# Patient Record
Sex: MALE | Race: WHITE
[De-identification: names, ages, dates, MRNs, and addresses within clinical notes are randomized per-mention and may not be internally consistent; named-entity substitution may affect disease eponyms.]

---

## 2019-10-18 NOTE — MRI
MRI LUMBAR SPINE WITH AND WITHOUT CONTRAST:



DATE:

10/18/2019



HISTORY:

84-year-old male with lumbar radiculopathy and low back pain



COMPARISON:

None available



TECHNIQUE:

Multiple sequences obtained in axial and sagittal planes, pre and post IV injection of gadolinium-bas
ed contrast agent.



FINDINGS:



For the purposes of this report, it will be assumed that there are 5 lumbar-type vertebrae. Vertebral
 body heights are maintained.



Marrow edema around a Schmorl's node at right lateral endplate of L3. Otherwise no major bone marrow 
signal abnormality. No major spondylolisthesis. Conus medullaris terminates at L1. Because of the

extremely severe central spinal canal stenosis at L3-4, there is tortuosity of the cauda equina at al
l levels superior to this and moderately at all levels inferior to this. No abnormal enhancement of

cauda equina. No epidural abscess. Presence of posterior element metallic hardware at lower levels ca
uses magnetic susceptibility artifact, making it difficult to evaluate for abnormal enhancement and

marrow signal abnormality at the lower levels, especially at the posterior elements.



T12-L1:Mild to moderate ligamentum flavum thickening. Mild bilateral facet DJD. Disc space maintained
. Minimal disc bulge. Mild central spinal canal stenosis. Moderate right neural foraminal stenosis.

Mild to moderate left neural foraminal stenosis.



L1-2:Mild disc space narrowing. Mild to moderate diffuse disc bulge. Moderate bilateral neural forami
nal stenosis.



L2-3:Moderate disc space narrowing. Prominent diffuse disc bulge. Moderate bilateral neural foraminal
 stenosis. Severe central spinal canal stenosis. Moderate bilateral facet DJD. Slight degenerative

retrolisthesis of L2 on L3.



L3-4:Severe bilateral facet DJD with significant hypertrophy. Moderate bilateral neural foraminal nahid
nosis, left worse than right. Mild to moderate disc space narrowing. In addition to diffuse disc

bulge, there is a small-moderate sized central disc herniation with slight superior migration of extr
uded disc material not quite reaching pedicle level of L3. Extremely severe central spinal canal

stenosis.



L4-5:Moderate right neural foraminal stenosis. Possibly severe left neural foraminal stenosis, althou
gh left posterior element metallic hardware magnetic susceptibility artifact carotid resulted in

apparently worst degree of neural foraminal stenosis at than actually present. Likewise, the presence
 of bilateral posterior element metallic hardware causes susceptibility artifact that makes it

difficult to evaluate the degree of central spinal canal stenosis and especially lateral recess steno
sis. Central spinal canal stenosis is probably not severe. Disc space maintained.



L5-S1:Severe disc space narrowing. No central stenosis or lateral recess stenosis. Posterior element 
metallic hardware makes it impossible to evaluate the degree of neural foraminal stenosis.



IMPRESSION:

1) lumbar spondylosis with multilevel degenerative disc disease and facet osteoarthrosis of varying d
egrees.

2) extremely severe central spinal canal stenosis at L3-4.

3) severe central spinal canal stenosis at L2-3.

4) posterior element metallic hardware causes magnetic susceptibility artifact, which makes it diffic
ult to evaluate degree of neural foraminal stenosis bilaterally at L5-S1 and on the left at L4-5,

and difficult to evaluate the degree of central spinal canal stenosis at L4-5.



Reported By: Jose Francisco Juarez 

Electronically Signed:  10/18/2019 2:11 PM

## 2019-10-22 NOTE — CT
CT LUMBAR SPINE WITHOUT CONTRAST

Multiplanar reconstruction and 3D post processing.

 

INDICATION: 

Lumbar stenosis with claudication.  Back pain.

 

FINDINGS: 

Lumbar vertebrae maintain height.  Moderate degenerative changes are present with osteophytes from al
l lumbar vertebrae.  Degenerative disk changes are noted.  Vacuum phenomenon was seen at L3-4 and L4-
5.  Loss of disk space is prominent at L5-S1.  

 

At L1-2, mild disk bulge.  Prominent facet hypertrophy.  Mild central canal stenosis.

 

At L2-3, slight posterolisthesis.  Disk bulge.  Prominent facet and ligamentous hypertrophy.  Severe 
central canal stenosis.  Bilateral foraminal stenosis.

 

At L3-4, degenerative disk change.  Mild anterolisthesis.  Diffuse disk bulge.  Prominent facet hyper
trophy.  Severe central canal stenosis.  Bilateral foraminal stenosis.  

 

At L4-5, broad-based disk bulge.  Severe facet hypertrophy.  Severe central canal stenosis.  Bilatera
l foraminal stenosis.  

 

Pedicle screws at L5.  The left pedicle screw courses along the superior margin of the left pedicle a
nd encroaches into the inferior aspect of the left foramina.  There are posterior laminectomy changes
 at this level.

 

At L5-S1, disk bulge and hypertrophic change.  Facet hypertrophy.  Mild central canal stenosis.  Bila
teral foraminal stenosis.

 

Bilateral pedicle screws at S1 extend along the superior aspect of the pedicle and encroach into the 
inferior aspect of the foramen on both sides.

 

IMPRESSION: 

1.  Severe central canal stenosis at L2-3 and L3-4 with foraminal stenosis described above.  Postoper
ative changes at L4-5 and L5-S1 as described.

 

2.  Pedicle screw placement as noted above.

 

POS: NEHAL

## 2019-10-22 NOTE — RAD
EXAM: 3 views of the lumbosacral spine



HISTORY: Low back pain



COMPARISON: None



FINDINGS: 3 views of the lumbosacral spine shows normal height and alignment of the vertebral bodies 
without fracture or subluxation. Intervertebral disc space narrowing and moderate osteophyte

formation is seen throughout the lumbar spine. Facet screws are seen at L4/5 and L5/S1. Alignment is 
unchanged with flexion and extension.



The sacroiliac joints are unremarkable.



IMPRESSION: Degenerative changes and post surgical changes of lumbar spine with unchanged alignment w
ith bending.



Reported By: Jeffrey Perea 

Electronically Signed:  10/22/2019 3:13 PM

## 2021-11-18 ENCOUNTER — HOSPITAL ENCOUNTER (INPATIENT)
Dept: HOSPITAL 18 - NAV ACUTE | Age: 86
LOS: 33 days | Discharge: NURSING, REHAB OR LONG-TERM CARE | DRG: 558 | End: 2021-12-21
Attending: INTERNAL MEDICINE | Admitting: INTERNAL MEDICINE
Payer: MEDICARE

## 2021-11-18 VITALS — BODY MASS INDEX: 29.6 KG/M2

## 2021-11-18 DIAGNOSIS — Z95.1: ICD-10-CM

## 2021-11-18 DIAGNOSIS — M46.1: ICD-10-CM

## 2021-11-18 DIAGNOSIS — R53.1: ICD-10-CM

## 2021-11-18 DIAGNOSIS — I25.10: ICD-10-CM

## 2021-11-18 DIAGNOSIS — Z87.891: ICD-10-CM

## 2021-11-18 DIAGNOSIS — B95.62: ICD-10-CM

## 2021-11-18 DIAGNOSIS — R09.02: ICD-10-CM

## 2021-11-18 DIAGNOSIS — Z88.0: ICD-10-CM

## 2021-11-18 DIAGNOSIS — Z79.899: ICD-10-CM

## 2021-11-18 DIAGNOSIS — G89.29: ICD-10-CM

## 2021-11-18 DIAGNOSIS — Z79.02: ICD-10-CM

## 2021-11-18 DIAGNOSIS — M71.152: Primary | ICD-10-CM

## 2021-11-18 DIAGNOSIS — Z85.46: ICD-10-CM

## 2021-11-18 DIAGNOSIS — E11.9: ICD-10-CM

## 2021-11-18 DIAGNOSIS — Z79.84: ICD-10-CM

## 2021-11-18 DIAGNOSIS — E78.5: ICD-10-CM

## 2021-11-18 DIAGNOSIS — Z96.651: ICD-10-CM

## 2021-11-18 DIAGNOSIS — I10: ICD-10-CM

## 2021-11-18 DIAGNOSIS — Z95.5: ICD-10-CM

## 2021-11-18 DIAGNOSIS — M54.42: ICD-10-CM

## 2021-11-18 DIAGNOSIS — K21.9: ICD-10-CM

## 2021-11-18 DIAGNOSIS — F32.A: ICD-10-CM

## 2021-11-18 DIAGNOSIS — Z20.822: ICD-10-CM

## 2021-11-18 DIAGNOSIS — F41.9: ICD-10-CM

## 2021-11-18 DIAGNOSIS — Z86.73: ICD-10-CM

## 2021-11-18 PROCEDURE — 83036 HEMOGLOBIN GLYCOSYLATED A1C: CPT

## 2021-11-18 PROCEDURE — 90471 IMMUNIZATION ADMIN: CPT

## 2021-11-18 PROCEDURE — 81001 URINALYSIS AUTO W/SCOPE: CPT

## 2021-11-18 PROCEDURE — U0003 INFECTIOUS AGENT DETECTION BY NUCLEIC ACID (DNA OR RNA); SEVERE ACUTE RESPIRATORY SYNDROME CORONAVIRUS 2 (SARS-COV-2) (CORONAVIRUS DISEASE [COVID-19]), AMPLIFIED PROBE TECHNIQUE, MAKING USE OF HIGH THROUGHPUT TECHNOLOGIES AS DESCRIBED BY CMS-2020-01-R: HCPCS

## 2021-11-18 PROCEDURE — U0002 COVID-19 LAB TEST NON-CDC: HCPCS

## 2021-11-18 PROCEDURE — U0005 INFEC AGEN DETEC AMPLI PROBE: HCPCS

## 2021-11-18 PROCEDURE — 85025 COMPLETE CBC W/AUTO DIFF WBC: CPT

## 2021-11-18 PROCEDURE — 72131 CT LUMBAR SPINE W/O DYE: CPT

## 2021-11-18 PROCEDURE — 90662 IIV NO PRSV INCREASED AG IM: CPT

## 2021-11-18 PROCEDURE — 36416 COLLJ CAPILLARY BLOOD SPEC: CPT

## 2021-11-18 PROCEDURE — G0008 ADMIN INFLUENZA VIRUS VAC: HCPCS

## 2021-11-18 PROCEDURE — 36415 COLL VENOUS BLD VENIPUNCTURE: CPT

## 2021-11-18 PROCEDURE — 80048 BASIC METABOLIC PNL TOTAL CA: CPT

## 2021-11-18 PROCEDURE — 84550 ASSAY OF BLOOD/URIC ACID: CPT

## 2021-11-18 RX ADMIN — HYDROCODONE BITARTRATE AND ACETAMINOPHEN PRN TAB: 5; 325 TABLET ORAL at 23:45

## 2021-11-18 RX ADMIN — SALINE NASAL SPRAY PRN SPR: 1.5 SOLUTION NASAL at 16:51

## 2021-11-19 LAB
ANION GAP SERPL CALC-SCNC: 12 MMOL/L (ref 10–20)
BASOPHILS # BLD AUTO: 0.1 THOU/UL (ref 0–0.2)
BASOPHILS NFR BLD AUTO: 0.8 % (ref 0–1)
BUN SERPL-MCNC: 21 MG/DL (ref 8.4–25.7)
CALCIUM SERPL-MCNC: 9.1 MG/DL (ref 7.8–10.44)
CHLORIDE SERPL-SCNC: 100 MMOL/L (ref 98–107)
CO2 SERPL-SCNC: 30 MMOL/L (ref 23–31)
CREAT CL PREDICTED SERPL C-G-VRATE: 109 ML/MIN (ref 70–130)
EOSINOPHIL # BLD AUTO: 0 THOU/UL (ref 0–0.7)
EOSINOPHIL NFR BLD AUTO: 0.2 % (ref 0–10)
GLUCOSE SERPL-MCNC: 110 MG/DL (ref 83–110)
HGB BLD-MCNC: 11.8 G/DL (ref 14–18)
LYMPHOCYTES # BLD AUTO: 1 THOU/UL (ref 1.2–3.4)
LYMPHOCYTES NFR BLD AUTO: 10.4 % (ref 21–51)
MCH RBC QN AUTO: 32.4 PG (ref 27–31)
MCV RBC AUTO: 100 FL (ref 78–98)
MONOCYTES # BLD AUTO: 0.9 THOU/UL (ref 0.11–0.59)
MONOCYTES NFR BLD AUTO: 9 % (ref 0–10)
NEUTROPHILS # BLD AUTO: 7.7 THOU/UL (ref 1.4–6.5)
NEUTROPHILS NFR BLD AUTO: 79.6 % (ref 42–75)
PLATELET # BLD AUTO: 300 THOU/UL (ref 130–400)
POTASSIUM SERPL-SCNC: 4.2 MMOL/L (ref 3.5–5.1)
RBC # BLD AUTO: 3.63 MILL/UL (ref 4.7–6.1)
SODIUM SERPL-SCNC: 138 MMOL/L (ref 136–145)
WBC # BLD AUTO: 9.6 THOU/UL (ref 4.8–10.8)

## 2021-11-19 RX ADMIN — HYDROCODONE BITARTRATE AND ACETAMINOPHEN PRN TAB: 5; 325 TABLET ORAL at 13:42

## 2021-11-19 RX ADMIN — HYDROCODONE BITARTRATE AND ACETAMINOPHEN PRN TAB: 5; 325 TABLET ORAL at 17:37

## 2021-11-19 RX ADMIN — HYDROCODONE BITARTRATE AND ACETAMINOPHEN PRN TAB: 5; 325 TABLET ORAL at 09:35

## 2021-11-20 RX ADMIN — HYDROCODONE BITARTRATE AND ACETAMINOPHEN PRN TAB: 5; 325 TABLET ORAL at 03:08

## 2021-11-20 RX ADMIN — HYDROCODONE BITARTRATE AND ACETAMINOPHEN PRN TAB: 5; 325 TABLET ORAL at 15:10

## 2021-11-20 RX ADMIN — HYDROCODONE BITARTRATE AND ACETAMINOPHEN PRN TAB: 5; 325 TABLET ORAL at 23:26

## 2021-11-20 RX ADMIN — HYDROCODONE BITARTRATE AND ACETAMINOPHEN PRN TAB: 5; 325 TABLET ORAL at 07:02

## 2021-11-20 RX ADMIN — HYDROCODONE BITARTRATE AND ACETAMINOPHEN PRN TAB: 5; 325 TABLET ORAL at 11:09

## 2021-11-20 RX ADMIN — HYDROCODONE BITARTRATE AND ACETAMINOPHEN PRN TAB: 5; 325 TABLET ORAL at 19:17

## 2021-11-21 RX ADMIN — HYDROCODONE BITARTRATE AND ACETAMINOPHEN PRN TAB: 5; 325 TABLET ORAL at 12:03

## 2021-11-21 RX ADMIN — HYDROCODONE BITARTRATE AND ACETAMINOPHEN PRN TAB: 5; 325 TABLET ORAL at 20:24

## 2021-11-21 RX ADMIN — HYDROCODONE BITARTRATE AND ACETAMINOPHEN PRN TAB: 5; 325 TABLET ORAL at 15:52

## 2021-11-21 RX ADMIN — SALINE NASAL SPRAY PRN SPR: 1.5 SOLUTION NASAL at 07:55

## 2021-11-21 RX ADMIN — HYDROCODONE BITARTRATE AND ACETAMINOPHEN PRN TAB: 5; 325 TABLET ORAL at 08:00

## 2021-11-21 RX ADMIN — HYDROCODONE BITARTRATE AND ACETAMINOPHEN PRN TAB: 5; 325 TABLET ORAL at 03:49

## 2021-11-22 RX ADMIN — ACETAMINOPHEN AND CODEINE PHOSPHATE PRN TAB: 300; 30 TABLET ORAL at 23:07

## 2021-11-22 RX ADMIN — HYDROCODONE BITARTRATE AND ACETAMINOPHEN PRN TAB: 5; 325 TABLET ORAL at 12:03

## 2021-11-22 RX ADMIN — ACETAMINOPHEN AND CODEINE PHOSPHATE PRN TAB: 300; 30 TABLET ORAL at 18:28

## 2021-11-22 RX ADMIN — HYDROCODONE BITARTRATE AND ACETAMINOPHEN PRN TAB: 5; 325 TABLET ORAL at 04:30

## 2021-11-23 LAB
PROT UR STRIP.AUTO-MCNC: 100 MG/DL
RBC UR QL AUTO: (no result) HPF (ref 0–3)
SP GR UR STRIP: 1.02 (ref 1–1.03)
WBC UR QL AUTO: (no result) HPF (ref 0–3)

## 2021-11-23 RX ADMIN — ACETAMINOPHEN AND CODEINE PHOSPHATE PRN TAB: 300; 30 TABLET ORAL at 12:48

## 2021-11-23 RX ADMIN — ACETAMINOPHEN AND CODEINE PHOSPHATE PRN TAB: 300; 30 TABLET ORAL at 17:19

## 2021-11-23 RX ADMIN — SALINE NASAL SPRAY PRN SPR: 1.5 SOLUTION NASAL at 00:28

## 2021-11-23 RX ADMIN — ACETAMINOPHEN AND CODEINE PHOSPHATE PRN TAB: 300; 30 TABLET ORAL at 20:46

## 2021-11-23 RX ADMIN — ACETAMINOPHEN AND CODEINE PHOSPHATE PRN TAB: 300; 30 TABLET ORAL at 04:11

## 2021-11-24 RX ADMIN — ACETAMINOPHEN AND CODEINE PHOSPHATE PRN TAB: 300; 30 TABLET ORAL at 21:01

## 2021-11-24 RX ADMIN — ACETAMINOPHEN AND CODEINE PHOSPHATE PRN TAB: 300; 30 TABLET ORAL at 12:35

## 2021-11-24 RX ADMIN — ACETAMINOPHEN AND CODEINE PHOSPHATE PRN TAB: 300; 30 TABLET ORAL at 06:11

## 2021-11-25 RX ADMIN — ACETAMINOPHEN AND CODEINE PHOSPHATE PRN TAB: 300; 30 TABLET ORAL at 21:13

## 2021-11-25 RX ADMIN — ACETAMINOPHEN AND CODEINE PHOSPHATE PRN TAB: 300; 30 TABLET ORAL at 10:42

## 2021-11-25 RX ADMIN — GUAIFENESIN AND DEXTROMETHORPHAN PRN ML: 100; 10 SYRUP ORAL at 10:41

## 2021-11-25 RX ADMIN — ACETAMINOPHEN AND CODEINE PHOSPHATE PRN TAB: 300; 30 TABLET ORAL at 05:57

## 2021-11-26 RX ADMIN — ACETAMINOPHEN AND CODEINE PHOSPHATE PRN TAB: 300; 30 TABLET ORAL at 11:34

## 2021-11-26 RX ADMIN — ACETAMINOPHEN AND CODEINE PHOSPHATE PRN TAB: 300; 30 TABLET ORAL at 23:45

## 2021-11-26 RX ADMIN — ACETAMINOPHEN AND CODEINE PHOSPHATE PRN TAB: 300; 30 TABLET ORAL at 17:29

## 2021-11-26 RX ADMIN — ACETAMINOPHEN AND CODEINE PHOSPHATE PRN TAB: 300; 30 TABLET ORAL at 08:01

## 2021-11-27 RX ADMIN — ACETAMINOPHEN AND CODEINE PHOSPHATE PRN TAB: 300; 30 TABLET ORAL at 21:18

## 2021-11-27 RX ADMIN — ACETAMINOPHEN AND CODEINE PHOSPHATE PRN TAB: 300; 30 TABLET ORAL at 04:40

## 2021-11-27 RX ADMIN — ACETAMINOPHEN AND CODEINE PHOSPHATE PRN TAB: 300; 30 TABLET ORAL at 17:12

## 2021-11-27 RX ADMIN — ACETAMINOPHEN AND CODEINE PHOSPHATE PRN TAB: 300; 30 TABLET ORAL at 08:04

## 2021-11-28 RX ADMIN — ACETAMINOPHEN AND CODEINE PHOSPHATE PRN TAB: 300; 30 TABLET ORAL at 08:05

## 2021-11-28 RX ADMIN — ACETAMINOPHEN AND CODEINE PHOSPHATE PRN TAB: 300; 30 TABLET ORAL at 20:27

## 2021-11-29 RX ADMIN — ACETAMINOPHEN AND CODEINE PHOSPHATE PRN TAB: 300; 30 TABLET ORAL at 08:11

## 2021-11-29 RX ADMIN — ACETAMINOPHEN AND CODEINE PHOSPHATE PRN TAB: 300; 30 TABLET ORAL at 18:08

## 2021-11-30 RX ADMIN — ACETAMINOPHEN AND CODEINE PHOSPHATE PRN TAB: 300; 30 TABLET ORAL at 11:47

## 2021-11-30 RX ADMIN — ACETAMINOPHEN AND CODEINE PHOSPHATE PRN TAB: 300; 30 TABLET ORAL at 20:19

## 2021-11-30 RX ADMIN — ACETAMINOPHEN AND CODEINE PHOSPHATE PRN TAB: 300; 30 TABLET ORAL at 08:09

## 2021-12-01 RX ADMIN — ACETAMINOPHEN AND CODEINE PHOSPHATE PRN TAB: 300; 30 TABLET ORAL at 19:30

## 2021-12-01 RX ADMIN — ACETAMINOPHEN AND CODEINE PHOSPHATE PRN TAB: 300; 30 TABLET ORAL at 08:05

## 2021-12-02 RX ADMIN — ACETAMINOPHEN AND CODEINE PHOSPHATE PRN TAB: 300; 30 TABLET ORAL at 07:57

## 2021-12-02 RX ADMIN — ACETAMINOPHEN AND CODEINE PHOSPHATE PRN TAB: 300; 30 TABLET ORAL at 13:49

## 2021-12-03 PROCEDURE — 0M9M3ZX DRAINAGE OF LEFT HIP BURSA AND LIGAMENT, PERCUTANEOUS APPROACH, DIAGNOSTIC: ICD-10-PCS | Performed by: INTERNAL MEDICINE

## 2021-12-03 RX ADMIN — ACETAMINOPHEN AND CODEINE PHOSPHATE PRN TAB: 300; 30 TABLET ORAL at 08:08

## 2021-12-05 RX ADMIN — ACETAMINOPHEN AND CODEINE PHOSPHATE PRN TAB: 300; 30 TABLET ORAL at 08:00

## 2021-12-06 RX ADMIN — SULFAMETHOXAZOLE AND TRIMETHOPRIM SCH TAB: 800; 160 TABLET ORAL at 20:29

## 2021-12-07 LAB
ANION GAP SERPL CALC-SCNC: 16 MMOL/L (ref 10–20)
BASOPHILS # BLD AUTO: 0.1 THOU/UL (ref 0–0.2)
BASOPHILS NFR BLD AUTO: 1.4 % (ref 0–1)
BUN SERPL-MCNC: 22 MG/DL (ref 8.4–25.7)
CALCIUM SERPL-MCNC: 10.1 MG/DL (ref 7.8–10.44)
CHLORIDE SERPL-SCNC: 99 MMOL/L (ref 98–107)
CO2 SERPL-SCNC: 24 MMOL/L (ref 23–31)
CREAT CL PREDICTED SERPL C-G-VRATE: 73 ML/MIN (ref 70–130)
EOSINOPHIL # BLD AUTO: 0.1 THOU/UL (ref 0–0.7)
EOSINOPHIL NFR BLD AUTO: 2.4 % (ref 0–10)
GLUCOSE SERPL-MCNC: 110 MG/DL (ref 83–110)
HGB BLD-MCNC: 13.5 G/DL (ref 14–18)
LYMPHOCYTES # BLD AUTO: 0.7 THOU/UL (ref 1.2–3.4)
LYMPHOCYTES NFR BLD AUTO: 10.6 % (ref 21–51)
MCH RBC QN AUTO: 32 PG (ref 27–31)
MCV RBC AUTO: 101 FL (ref 78–98)
MONOCYTES # BLD AUTO: 0.8 THOU/UL (ref 0.11–0.59)
MONOCYTES NFR BLD AUTO: 13 % (ref 0–10)
NEUTROPHILS # BLD AUTO: 4.5 THOU/UL (ref 1.4–6.5)
NEUTROPHILS NFR BLD AUTO: 72.7 % (ref 42–75)
PLATELET # BLD AUTO: 288 THOU/UL (ref 130–400)
POTASSIUM SERPL-SCNC: 5.1 MMOL/L (ref 3.5–5.1)
RBC # BLD AUTO: 4.22 MILL/UL (ref 4.7–6.1)
SODIUM SERPL-SCNC: 134 MMOL/L (ref 136–145)
WBC # BLD AUTO: 6.2 THOU/UL (ref 4.8–10.8)

## 2021-12-07 RX ADMIN — SULFAMETHOXAZOLE AND TRIMETHOPRIM SCH TAB: 800; 160 TABLET ORAL at 20:29

## 2021-12-07 RX ADMIN — SULFAMETHOXAZOLE AND TRIMETHOPRIM SCH TAB: 800; 160 TABLET ORAL at 07:48

## 2021-12-08 RX ADMIN — SULFAMETHOXAZOLE AND TRIMETHOPRIM SCH TAB: 800; 160 TABLET ORAL at 21:05

## 2021-12-08 RX ADMIN — ACETAMINOPHEN AND CODEINE PHOSPHATE PRN TAB: 300; 30 TABLET ORAL at 09:05

## 2021-12-08 RX ADMIN — SULFAMETHOXAZOLE AND TRIMETHOPRIM SCH TAB: 800; 160 TABLET ORAL at 09:03

## 2021-12-08 RX ADMIN — SALINE NASAL SPRAY PRN SPR: 1.5 SOLUTION NASAL at 09:07

## 2021-12-09 RX ADMIN — SULFAMETHOXAZOLE AND TRIMETHOPRIM SCH TAB: 800; 160 TABLET ORAL at 20:44

## 2021-12-09 RX ADMIN — SULFAMETHOXAZOLE AND TRIMETHOPRIM SCH TAB: 800; 160 TABLET ORAL at 08:07

## 2021-12-10 RX ADMIN — ACETAMINOPHEN AND CODEINE PHOSPHATE PRN TAB: 300; 30 TABLET ORAL at 07:39

## 2021-12-10 RX ADMIN — SULFAMETHOXAZOLE AND TRIMETHOPRIM SCH TAB: 800; 160 TABLET ORAL at 08:40

## 2021-12-10 RX ADMIN — SULFAMETHOXAZOLE AND TRIMETHOPRIM SCH TAB: 800; 160 TABLET ORAL at 20:42

## 2021-12-11 RX ADMIN — SULFAMETHOXAZOLE AND TRIMETHOPRIM SCH TAB: 800; 160 TABLET ORAL at 07:47

## 2021-12-11 RX ADMIN — ACETAMINOPHEN AND CODEINE PHOSPHATE PRN TAB: 300; 30 TABLET ORAL at 07:44

## 2021-12-11 RX ADMIN — SULFAMETHOXAZOLE AND TRIMETHOPRIM SCH TAB: 800; 160 TABLET ORAL at 20:16

## 2021-12-12 RX ADMIN — SULFAMETHOXAZOLE AND TRIMETHOPRIM SCH TAB: 800; 160 TABLET ORAL at 07:56

## 2021-12-12 RX ADMIN — SULFAMETHOXAZOLE AND TRIMETHOPRIM SCH TAB: 800; 160 TABLET ORAL at 20:54

## 2021-12-13 RX ADMIN — SULFAMETHOXAZOLE AND TRIMETHOPRIM SCH TAB: 800; 160 TABLET ORAL at 20:07

## 2021-12-13 RX ADMIN — SULFAMETHOXAZOLE AND TRIMETHOPRIM SCH TAB: 800; 160 TABLET ORAL at 09:04

## 2021-12-14 RX ADMIN — ACETAMINOPHEN AND CODEINE PHOSPHATE PRN TAB: 300; 30 TABLET ORAL at 13:49

## 2021-12-14 RX ADMIN — SULFAMETHOXAZOLE AND TRIMETHOPRIM SCH TAB: 800; 160 TABLET ORAL at 07:53

## 2021-12-14 RX ADMIN — ACETAMINOPHEN AND CODEINE PHOSPHATE PRN TAB: 300; 30 TABLET ORAL at 08:28

## 2021-12-14 RX ADMIN — SULFAMETHOXAZOLE AND TRIMETHOPRIM SCH TAB: 800; 160 TABLET ORAL at 21:10

## 2021-12-15 RX ADMIN — GUAIFENESIN AND DEXTROMETHORPHAN PRN ML: 100; 10 SYRUP ORAL at 07:42

## 2021-12-15 RX ADMIN — SULFAMETHOXAZOLE AND TRIMETHOPRIM SCH TAB: 800; 160 TABLET ORAL at 20:54

## 2021-12-15 RX ADMIN — SULFAMETHOXAZOLE AND TRIMETHOPRIM SCH TAB: 800; 160 TABLET ORAL at 07:43

## 2021-12-16 RX ADMIN — SULFAMETHOXAZOLE AND TRIMETHOPRIM SCH TAB: 800; 160 TABLET ORAL at 09:12

## 2021-12-16 RX ADMIN — SULFAMETHOXAZOLE AND TRIMETHOPRIM SCH TAB: 800; 160 TABLET ORAL at 21:41

## 2021-12-17 RX ADMIN — SULFAMETHOXAZOLE AND TRIMETHOPRIM SCH TAB: 800; 160 TABLET ORAL at 20:45

## 2021-12-17 RX ADMIN — SULFAMETHOXAZOLE AND TRIMETHOPRIM SCH TAB: 800; 160 TABLET ORAL at 09:44

## 2021-12-18 RX ADMIN — SULFAMETHOXAZOLE AND TRIMETHOPRIM SCH TAB: 800; 160 TABLET ORAL at 21:19

## 2021-12-18 RX ADMIN — SULFAMETHOXAZOLE AND TRIMETHOPRIM SCH TAB: 800; 160 TABLET ORAL at 08:01

## 2021-12-19 RX ADMIN — SULFAMETHOXAZOLE AND TRIMETHOPRIM SCH TAB: 800; 160 TABLET ORAL at 20:22

## 2021-12-19 RX ADMIN — SULFAMETHOXAZOLE AND TRIMETHOPRIM SCH TAB: 800; 160 TABLET ORAL at 08:22

## 2021-12-20 RX ADMIN — SULFAMETHOXAZOLE AND TRIMETHOPRIM SCH TAB: 800; 160 TABLET ORAL at 19:59

## 2021-12-20 RX ADMIN — SULFAMETHOXAZOLE AND TRIMETHOPRIM SCH TAB: 800; 160 TABLET ORAL at 09:16

## 2021-12-20 RX ADMIN — ACETAMINOPHEN AND CODEINE PHOSPHATE PRN TAB: 300; 30 TABLET ORAL at 19:32

## 2021-12-21 VITALS — TEMPERATURE: 98.2 F | SYSTOLIC BLOOD PRESSURE: 144 MMHG | DIASTOLIC BLOOD PRESSURE: 66 MMHG

## 2021-12-21 LAB
ANION GAP SERPL CALC-SCNC: 12 MMOL/L (ref 10–20)
BASOPHILS # BLD AUTO: 0.1 THOU/UL (ref 0–0.2)
BASOPHILS NFR BLD AUTO: 1.1 % (ref 0–1)
BUN SERPL-MCNC: 25 MG/DL (ref 8.4–25.7)
CALCIUM SERPL-MCNC: 9.6 MG/DL (ref 7.8–10.44)
CHLORIDE SERPL-SCNC: 104 MMOL/L (ref 98–107)
CO2 SERPL-SCNC: 25 MMOL/L (ref 23–31)
CREAT CL PREDICTED SERPL C-G-VRATE: 60 ML/MIN (ref 70–130)
EOSINOPHIL # BLD AUTO: 0.2 THOU/UL (ref 0–0.7)
EOSINOPHIL NFR BLD AUTO: 2.6 % (ref 0–10)
GLUCOSE SERPL-MCNC: 100 MG/DL (ref 83–110)
HGB BLD-MCNC: 11.2 G/DL (ref 14–18)
LYMPHOCYTES # BLD AUTO: 1.9 THOU/UL (ref 1.2–3.4)
LYMPHOCYTES NFR BLD AUTO: 23.1 % (ref 21–51)
MCH RBC QN AUTO: 30.6 PG (ref 27–31)
MCV RBC AUTO: 99.1 FL (ref 78–98)
MONOCYTES # BLD AUTO: 0.9 THOU/UL (ref 0.11–0.59)
MONOCYTES NFR BLD AUTO: 11 % (ref 0–10)
NEUTROPHILS # BLD AUTO: 5.2 THOU/UL (ref 1.4–6.5)
NEUTROPHILS NFR BLD AUTO: 62.3 % (ref 42–75)
PLATELET # BLD AUTO: 393 THOU/UL (ref 130–400)
POTASSIUM SERPL-SCNC: 4.6 MMOL/L (ref 3.5–5.1)
RBC # BLD AUTO: 3.65 MILL/UL (ref 4.7–6.1)
SODIUM SERPL-SCNC: 136 MMOL/L (ref 136–145)
WBC # BLD AUTO: 8.4 THOU/UL (ref 4.8–10.8)

## 2021-12-21 RX ADMIN — SULFAMETHOXAZOLE AND TRIMETHOPRIM SCH TAB: 800; 160 TABLET ORAL at 08:20

## 2021-12-22 ENCOUNTER — HOSPITAL ENCOUNTER (OUTPATIENT)
Dept: HOSPITAL 92 - SCSMRI | Age: 86
Discharge: HOME | End: 2021-12-22
Attending: ORTHOPAEDIC SURGERY
Payer: MEDICARE

## 2021-12-22 DIAGNOSIS — S76.012A: ICD-10-CM

## 2021-12-22 DIAGNOSIS — M70.62: Primary | ICD-10-CM

## 2021-12-22 DIAGNOSIS — M43.8X6: ICD-10-CM

## 2021-12-22 DIAGNOSIS — M87.852: ICD-10-CM

## 2022-07-18 ENCOUNTER — HOSPITAL ENCOUNTER (OUTPATIENT)
Dept: HOSPITAL 18 - NAV RAD | Age: 87
Discharge: HOME | End: 2022-07-18
Attending: STUDENT IN AN ORGANIZED HEALTH CARE EDUCATION/TRAINING PROGRAM
Payer: MEDICARE

## 2022-07-18 DIAGNOSIS — R05.1: Primary | ICD-10-CM

## 2022-07-18 PROCEDURE — 71046 X-RAY EXAM CHEST 2 VIEWS: CPT

## 2022-11-07 ENCOUNTER — HOSPITAL ENCOUNTER (EMERGENCY)
Dept: HOSPITAL 18 - NAV ERS | Age: 87
Discharge: HOME | End: 2022-11-07
Payer: MEDICARE

## 2022-11-07 DIAGNOSIS — Z79.899: ICD-10-CM

## 2022-11-07 DIAGNOSIS — I10: ICD-10-CM

## 2022-11-07 DIAGNOSIS — Z79.82: ICD-10-CM

## 2022-11-07 DIAGNOSIS — N39.0: Primary | ICD-10-CM

## 2022-11-07 DIAGNOSIS — E78.5: ICD-10-CM

## 2022-11-07 DIAGNOSIS — E11.9: ICD-10-CM

## 2022-11-07 DIAGNOSIS — Z79.84: ICD-10-CM

## 2022-11-07 LAB
PROT UR STRIP.AUTO-MCNC: (no result) MG/DL
RBC UR QL AUTO: (no result) HPF (ref 0–3)
SP GR UR STRIP: 1.03 (ref 1–1.04)

## 2022-11-07 PROCEDURE — 81015 MICROSCOPIC EXAM OF URINE: CPT

## 2022-11-07 PROCEDURE — 81003 URINALYSIS AUTO W/O SCOPE: CPT

## 2022-11-07 PROCEDURE — 87086 URINE CULTURE/COLONY COUNT: CPT

## 2022-11-07 PROCEDURE — 99283 EMERGENCY DEPT VISIT LOW MDM: CPT
